# Patient Record
Sex: FEMALE | Race: BLACK OR AFRICAN AMERICAN | URBAN - METROPOLITAN AREA
[De-identification: names, ages, dates, MRNs, and addresses within clinical notes are randomized per-mention and may not be internally consistent; named-entity substitution may affect disease eponyms.]

---

## 2019-04-29 ENCOUNTER — OFFICE VISIT (OUTPATIENT)
Dept: INTERNAL MEDICINE CLINIC | Age: 24
End: 2019-04-29

## 2019-04-29 VITALS
SYSTOLIC BLOOD PRESSURE: 103 MMHG | HEIGHT: 66 IN | HEART RATE: 71 BPM | RESPIRATION RATE: 20 BRPM | OXYGEN SATURATION: 99 % | DIASTOLIC BLOOD PRESSURE: 65 MMHG | BODY MASS INDEX: 26.52 KG/M2 | TEMPERATURE: 98.6 F | WEIGHT: 165 LBS

## 2019-04-29 DIAGNOSIS — Z76.89 ENCOUNTER TO ESTABLISH CARE: ICD-10-CM

## 2019-04-29 DIAGNOSIS — E73.9 LACTOSE INTOLERANCE: Primary | ICD-10-CM

## 2019-04-29 DIAGNOSIS — Z91.018 NUT ALLERGY: ICD-10-CM

## 2019-04-29 NOTE — PROGRESS NOTES
HISTORY OF PRESENT ILLNESS Alem Mike is a 21 y.o. female. Pt here to establish care. She has not PCP She has no ongoing medical issues. Reports does not tolerate red meat--it bothers her stomach Eats mostly chicken or fish Regular milk bothers her stomach. Gas, stomach cramps. Past 2 months has been going to gym regularly at least twice a week. Establish Care The history is provided by the patient. Review of Systems Constitutional:  
     Nut allergies Gastrointestinal:  
     Lactose intolerant All other systems reviewed and are negative. Physical Exam  
Constitutional: She is oriented to person, place, and time. She appears well-developed and well-nourished. No distress. HENT:  
Head: Normocephalic. Eyes: Conjunctivae and EOM are normal.  
Neck: No thyromegaly present. Cardiovascular: Normal rate and regular rhythm. Pulmonary/Chest: Effort normal and breath sounds normal.  
Abdominal: Soft. Musculoskeletal: She exhibits no edema. Lymphadenopathy:  
  She has no cervical adenopathy. Neurological: She is alert and oriented to person, place, and time. Skin: Skin is warm and dry. She is not diaphoretic. Psychiatric: She has a normal mood and affect. Nursing note and vitals reviewed. ASSESSMENT and PLAN 
  ICD-10-CM ICD-9-CM 1. Lactose intolerance E73.9 271.3 2. Nut allergy Z91.018 V15.05   
3. Encounter to establish care Z76.89 V65.8 Past medical history, surgical history, family history, and social history reviewed with patient 
 
disccussed office procedures Discussed BMI/weight, lifestyle, diet and exercise. Discussed effect on blood pressure, blood sugar, and joints especially Focus on limiting white carbs, portion control, and moving more. She is just barely overweight. Tries to eat well and is working out. F/u this summer for [employer].

## 2019-04-29 NOTE — PROGRESS NOTES
ROOM # 5 Carolyn aBshir presents today for Chief Complaint Patient presents with [facility] Care Carolyn Bashir preferred language for health care discussion is english/other. Is someone accompanying this pt? no 
 
Is the patient using any DME equipment during OV? no 
 
Depression Screening: No flowsheet data found. Learning Assessment: 
Learning Assessment 4/29/2019 PRIMARY LEARNER Patient HIGHEST LEVEL OF EDUCATION - PRIMARY LEARNER  4 YEARS OF COLLEGE  
BARRIERS PRIMARY LEARNER NONE PRIMARY LANGUAGE ENGLISH  
LEARNER PREFERENCE PRIMARY DEMONSTRATION  
ANSWERED BY patient RELATIONSHIP SELF Abuse Screening: 
Abuse Screening Questionnaire 4/29/2019 Do you ever feel afraid of your partner? Beauregard Rustam Are you in a relationship with someone who physically or mentally threatens you? Rita Rustam Is it safe for you to go home? Epimenio  Fall Risk No flowsheet data found. Health Maintenance reviewed and discussed per provider. Yes Carolyn Bashir is due for Health Maintenance Due Topic Date Due  
 HPV Age 9Y-34Y (1 - Female 3-dose series) 03/24/2009  DTaP/Tdap/Td series (1 - Tdap) 03/24/2015  PAP AKA CERVICAL CYTOLOGY  03/24/2015 Please order/place referral if appropriate. Advance Directive: 1. Do you have an advance directive in place? Patient Reply: no 
 
2. If not, would you like material regarding how to put one in place? Patient Reply: no 
 
Coordination of Care: 1. Have you been to the ER, urgent care clinic since your last visit? Hospitalized since your last visit? no 
 
2. Have you seen or consulted any other health care providers outside of the [address] [place] [address] since your last visit? Include any pap smears or colon screening.  no

## 2019-07-18 ENCOUNTER — OFFICE VISIT (OUTPATIENT)
Dept: INTERNAL MEDICINE CLINIC | Age: 24
End: 2019-07-18

## 2019-07-18 VITALS
BODY MASS INDEX: 26.68 KG/M2 | TEMPERATURE: 99 F | HEART RATE: 75 BPM | WEIGHT: 166 LBS | OXYGEN SATURATION: 97 % | SYSTOLIC BLOOD PRESSURE: 105 MMHG | HEIGHT: 66 IN | DIASTOLIC BLOOD PRESSURE: 70 MMHG | RESPIRATION RATE: 20 BRPM

## 2019-07-18 NOTE — PROGRESS NOTES
ROOM # 5    Masha Farah presents today for   Chief Complaint   Patient presents with    Well Woman       Masha Farah preferred language for health care discussion is english/other. Is someone accompanying this pt? no    Is the patient using any DME equipment during OV? no    Depression Screening:  No flowsheet data found. Learning Assessment:  Learning Assessment 4/29/2019   PRIMARY LEARNER Patient   HIGHEST LEVEL OF EDUCATION - PRIMARY LEARNER  4 YEARS OF COLLEGE   BARRIERS PRIMARY LEARNER NONE   PRIMARY LANGUAGE ENGLISH   LEARNER PREFERENCE PRIMARY DEMONSTRATION   ANSWERED BY patient   RELATIONSHIP SELF       Abuse Screening:  Abuse Screening Questionnaire 4/29/2019   Do you ever feel afraid of your partner? N   Are you in a relationship with someone who physically or mentally threatens you? N   Is it safe for you to go home? Y       Fall Risk  No flowsheet data found. Health Maintenance reviewed and discussed per provider. Yes    Masha Farah is due for   Health Maintenance Due   Topic Date Due    HPV Age 9Y-34Y (1 - Female 3-dose series) 03/24/2009    DTaP/Tdap/Td series (1 - Tdap) 03/24/2015    PAP AKA CERVICAL CYTOLOGY  03/24/2015     Please order/place referral if appropriate. Advance Directive:  1. Do you have an advance directive in place? Patient Reply: no    2. If not, would you like material regarding how to put one in place? Patient Reply: no    Coordination of Care:  1. Have you been to the ER, urgent care clinic since your last visit? Hospitalized since your last visit? no    2. Have you seen or consulted any other health care providers outside of the [address] [place] [address] since your last visit? Include any pap smears or colon screening.  no

## 2019-08-01 ENCOUNTER — OFFICE VISIT (OUTPATIENT)
Dept: INTERNAL MEDICINE CLINIC | Age: 24
End: 2019-08-01

## 2019-08-01 VITALS
OXYGEN SATURATION: 98 % | SYSTOLIC BLOOD PRESSURE: 103 MMHG | HEIGHT: 66 IN | RESPIRATION RATE: 16 BRPM | BODY MASS INDEX: 26.2 KG/M2 | TEMPERATURE: 97.8 F | DIASTOLIC BLOOD PRESSURE: 68 MMHG | WEIGHT: 163 LBS | HEART RATE: 81 BPM

## 2019-08-01 DIAGNOSIS — Z11.3 SCREEN FOR STD (SEXUALLY TRANSMITTED DISEASE): ICD-10-CM

## 2019-08-01 DIAGNOSIS — Z01.419 WELL WOMAN EXAM WITH ROUTINE GYNECOLOGICAL EXAM: Primary | ICD-10-CM

## 2019-08-01 NOTE — PROGRESS NOTES
HISTORY OF PRESENT ILLNESS  Federico Gr is a 21 y.o. female. Visit Vitals  /68 (BP 1 Location: Right arm, BP Patient Position: Sitting)   Pulse 81   Temp 97.8 °F (36.6 °C) (Oral)   Resp 16   Ht 5' 6\" (1.676 m)   Wt 163 lb (73.9 kg)   SpO2 98%   BMI 26.31 kg/m²       Well Woman   The history is provided by the patient. This is a new problem. Review of Systems   All other systems reviewed and are negative. Physical Exam   Constitutional: She is oriented to person, place, and time. She appears well-developed and well-nourished. No distress. Cardiovascular: Normal rate and regular rhythm. Pulmonary/Chest: Effort normal and breath sounds normal. No breast swelling, tenderness, discharge or bleeding. Genitourinary: No breast swelling, tenderness, discharge or bleeding. There is no rash or tenderness on the right labia. There is no rash or tenderness on the left labia. Musculoskeletal: She exhibits no edema. Neurological: She is alert and oriented to person, place, and time. Skin: Skin is warm and dry. She is not diaphoretic. Psychiatric: She has a normal mood and affect. Nursing note and vitals reviewed. ASSESSMENT and PLAN    ICD-10-CM ICD-9-CM    1. Well woman exam with routine gynecological exam Z01.419 V72.31 PAP, LB, RFX HPV [id number])      CHLAMYDIA/NEISSERIA BY ISABELLE W/REFLEX CONFIRM      PAP, LB, RFX HPV [id number])   2.        Screen for sexually trasmitted diseases Z11.3         Looks great  Doing well    F/u annually or prn

## 2019-08-01 NOTE — PROGRESS NOTES
ROOM # 81 Smooth Maynard presents today for   Chief Complaint   Patient presents with    Well Woman       Polo Level preferred language for health care discussion is english/other. Is someone accompanying this pt? no    Is the patient using any DME equipment during OV? no    Depression Screening:  No flowsheet data found. Learning Assessment:  Learning Assessment 4/29/2019   PRIMARY LEARNER Patient   HIGHEST LEVEL OF EDUCATION - PRIMARY LEARNER  4 YEARS OF COLLEGE   BARRIERS PRIMARY LEARNER NONE   PRIMARY LANGUAGE ENGLISH   LEARNER PREFERENCE PRIMARY DEMONSTRATION   ANSWERED BY patient   RELATIONSHIP SELF       Abuse Screening:  Abuse Screening Questionnaire 4/29/2019   Do you ever feel afraid of your partner? N   Are you in a relationship with someone who physically or mentally threatens you? N   Is it safe for you to go home? Y       Fall Risk  No flowsheet data found. Health Maintenance reviewed and discussed per provider. Yes    Polo Level is due for   Health Maintenance Due   Topic Date Due    HPV Age 9Y-34Y (1 - Female 3-dose series) 03/24/2009    DTaP/Tdap/Td series (1 - Tdap) 03/24/2015    PAP AKA CERVICAL CYTOLOGY  03/24/2015    Influenza Age 9 to Adult  08/01/2019     Please order/place referral if appropriate. Advance Directive:  1. Do you have an advance directive in place? Patient Reply: no    2. If not, would you like material regarding how to put one in place? Patient Reply: no    Coordination of Care:  1. Have you been to the ER, urgent care clinic since your last visit? Hospitalized since your last visit? no    2. Have you seen or consulted any other health care providers outside of the [address] [place] [address] since your last visit? Include any pap smears or colon screening.  no

## 2019-08-02 LAB
CHLAMYDIA TRACHOMATIS, NAA, 180097: NEGATIVE
NEISSERIA GONORRHOEAE, NAA, 180104: NEGATIVE

## 2019-08-05 LAB — GYNECOLOGIC CYTOLOGY REPORT, 18859: NORMAL

## 2022-03-10 ENCOUNTER — OFFICE VISIT (OUTPATIENT)
Dept: INTERNAL MEDICINE CLINIC | Age: 27
End: 2022-03-10
Payer: COMMERCIAL

## 2022-03-10 VITALS
TEMPERATURE: 97.2 F | HEART RATE: 74 BPM | OXYGEN SATURATION: 98 % | HEIGHT: 66 IN | BODY MASS INDEX: 26.84 KG/M2 | SYSTOLIC BLOOD PRESSURE: 107 MMHG | RESPIRATION RATE: 16 BRPM | WEIGHT: 167 LBS | DIASTOLIC BLOOD PRESSURE: 57 MMHG

## 2022-03-10 DIAGNOSIS — Z13.0 SCREENING FOR DEFICIENCY ANEMIA: ICD-10-CM

## 2022-03-10 DIAGNOSIS — Z13.1 SCREENING FOR DIABETES MELLITUS: ICD-10-CM

## 2022-03-10 DIAGNOSIS — Z01.419 WELL WOMAN EXAM WITH ROUTINE GYNECOLOGICAL EXAM: ICD-10-CM

## 2022-03-10 DIAGNOSIS — Z11.59 NEED FOR HEPATITIS C SCREENING TEST: ICD-10-CM

## 2022-03-10 DIAGNOSIS — Z13.89 SCREENING FOR GENITOURINARY CONDITION: ICD-10-CM

## 2022-03-10 DIAGNOSIS — Z00.00 ROUTINE GENERAL MEDICAL EXAMINATION AT A HEALTH CARE FACILITY: Primary | ICD-10-CM

## 2022-03-10 DIAGNOSIS — Z13.220 SCREENING FOR LIPID DISORDERS: ICD-10-CM

## 2022-03-10 DIAGNOSIS — Z11.3 SCREEN FOR STD (SEXUALLY TRANSMITTED DISEASE): ICD-10-CM

## 2022-03-10 PROCEDURE — 99395 PREV VISIT EST AGE 18-39: CPT | Performed by: INTERNAL MEDICINE

## 2022-03-10 NOTE — PROGRESS NOTES
1. \"Have you been to the ER, urgent care clinic since your last visit? Hospitalized since your last visit? \" No    2. \"Have you seen or consulted any other health care providers outside of the [address] [place] [address] since your last visit? \" No     3. For patients aged 39-70: Has the patient had a colonoscopy / FIT/ Cologuard? NA - based on age      If the patient is female:    4. For patients aged 41-77: Has the patient had a mammogram within the past 2 years? NA - based on age or sex      11. For patients aged 21-65: Has the patient had a pap smear?  No

## 2022-03-10 NOTE — PROGRESS NOTES
HISTORY OF PRESENT ILLNESS  Gaudencio Gorman is a 31 y.o. female. BP (!) 107/57 (BP 1 Location: Left upper arm, BP Patient Position: Sitting, BP Cuff Size: Adult)   Pulse 74   Temp 97.2 °F (36.2 °C) (Temporal)   Resp 16   Ht 5' 6\" (1.676 m)   Wt 167 lb (75.8 kg)   LMP 02/22/2022 (Exact Date)   SpO2 98%   BMI 26.95 kg/m²     Getting  later this year. Physical  The history is provided by the patient. Review of Systems   Constitutional: Negative. Negative for chills and fever. Respiratory: Negative. Cardiovascular: Negative. Gastrointestinal: Negative. Genitourinary:        Had one irregular period in Jan/Feb.       Physical Exam  Vitals and nursing note reviewed. Exam conducted with a chaperone present. Constitutional:       General: She is not in acute distress. Appearance: Normal appearance. She is well-developed. She is not diaphoretic. HENT:      Head: Normocephalic and atraumatic. Right Ear: Ear canal and external ear normal.      Left Ear: Ear canal and external ear normal.      Nose: Nose normal.      Mouth/Throat:      Mouth: Mucous membranes are moist.      Pharynx: No oropharyngeal exudate. Eyes:      General: No scleral icterus. Right eye: No discharge. Left eye: No discharge. Conjunctiva/sclera: Conjunctivae normal.      Pupils: Pupils are equal, round, and reactive to light. Neck:      Thyroid: No thyromegaly. Vascular: No JVD. Trachea: No tracheal deviation. Cardiovascular:      Rate and Rhythm: Normal rate and regular rhythm. Heart sounds: Normal heart sounds. No murmur heard. No gallop. Pulmonary:      Effort: Pulmonary effort is normal. No respiratory distress. Breath sounds: Normal breath sounds. No wheezing or rales. Abdominal:      General: Bowel sounds are normal. There is no distension. Palpations: Abdomen is soft. There is no mass. Tenderness: There is no abdominal tenderness.  There is no guarding or rebound. Genitourinary:     Exam position: Supine. Labia:         Right: No rash. Left: No rash. Vagina: Normal.      Cervix: Normal.      Uterus: Normal.       Adnexa: Right adnexa normal and left adnexa normal.   Musculoskeletal:         General: No tenderness. Cervical back: Normal range of motion and neck supple. Lymphadenopathy:      Cervical: No cervical adenopathy. Skin:     General: Skin is warm and dry. Findings: No erythema or rash. Neurological:      General: No focal deficit present. Mental Status: She is alert and oriented to person, place, and time. She is not disoriented. Cranial Nerves: No cranial nerve deficit. Sensory: No sensory deficit. Motor: No abnormal muscle tone. Coordination: Coordination normal.      Gait: Gait normal.      Deep Tendon Reflexes: Reflexes are normal and symmetric. Reflexes normal.      Comments:          Psychiatric:         Mood and Affect: Mood normal.         Speech: Speech normal.         Behavior: Behavior normal.         Thought Content: Thought content normal.         Judgment: Judgment normal.         ASSESSMENT and PLAN    ICD-10-CM ICD-9-CM    1. Routine general medical examination at a health care facility  Z00.00 V70.0 URINALYSIS W/ RFLX MICROSCOPIC   2. Well woman exam with routine gynecological exam  Z01.419 V72.31 PAP IG, APTIMA HPV AND RFX 16/18,45 ([id number])      CHLAMYDIA/NEISSERIA AMPLIFICATION   3. Screening for diabetes mellitus  B47.2 H58.4 METABOLIC PANEL, BASIC   4. Screening for deficiency anemia  Z13.0 V78.1 CBC WITH AUTOMATED DIFF   5. Screening for lipid disorders  Z13.220 V77.91 LIPID PANEL   6. Need for hepatitis C screening test  Z11.59 V73.89 HCV AB W/RFLX TO ISABELLE   7. Screening for genitourinary condition  Z13.89 V81.6 URINALYSIS W/ RFLX MICROSCOPIC   8.  Screen for STD (sexually transmitted disease)  Z11.3 V74.5 PAP IG, APTIMA HPV AND RFX 16/18,45 ([id number]) CHLAMYDIA/NEISSERIA AMPLIFICATION      T VAGINALIS AMPLIFICATION         Doing well    Update lab    F/u annually or prn

## 2022-03-11 LAB
ABSOLUTE LYMPHOCYTE COUNT, 10803: 2.3 K/UL (ref 1–4.8)
ANION GAP SERPL CALC-SCNC: 12 MMOL/L (ref 3–15)
BASOPHILS # BLD: 0 K/UL (ref 0–0.2)
BASOPHILS NFR BLD: 0 % (ref 0–2)
BILIRUB UR QL: NEGATIVE
BUN SERPL-MCNC: 9 MG/DL (ref 6–22)
CALCIUM SERPL-MCNC: 10 MG/DL (ref 8.4–10.5)
CHLAMYDIA TRACHOMATIS, NAA, 180097: NEGATIVE
CHLORIDE SERPL-SCNC: 104 MMOL/L (ref 98–110)
CHOLEST SERPL-MCNC: 207 MG/DL (ref 110–200)
CLARITY: CLEAR
CO2 SERPL-SCNC: 22 MMOL/L (ref 20–32)
COLOR UR: YELLOW
CREAT SERPL-MCNC: 0.8 MG/DL (ref 0.5–1.2)
EOSINOPHIL # BLD: 0.1 K/UL (ref 0–0.5)
EOSINOPHIL NFR BLD: 1 % (ref 0–6)
ERYTHROCYTE [DISTWIDTH] IN BLOOD BY AUTOMATED COUNT: 14.1 % (ref 10–15.5)
GFRAA, 66117: >60
GFRNA, 66118: >60
GLUCOSE SERPL-MCNC: 79 MG/DL (ref 70–99)
GLUCOSE UR QL: NEGATIVE MG/DL
GRANULOCYTES,GRANS: 67 % (ref 40–75)
HCT VFR BLD AUTO: 39.8 % (ref 35.1–46.5)
HCV AB SER IA-ACNC: NORMAL
HDLC SERPL-MCNC: 2.9 MG/DL (ref 0–5)
HDLC SERPL-MCNC: 71 MG/DL
HGB BLD-MCNC: 12.4 G/DL (ref 11.7–15.5)
HGB UR QL STRIP: NEGATIVE
KETONES UR QL STRIP.AUTO: NEGATIVE MG/DL
LDL/HDL RATIO,LDHD: 1.8
LDLC SERPL CALC-MCNC: 124 MG/DL (ref 50–99)
LEUKOCYTE ESTERASE: NEGATIVE
LYMPHOCYTES, LYMLT: 25 % (ref 20–45)
MCH RBC QN AUTO: 26 PG (ref 26–34)
MCHC RBC AUTO-ENTMCNC: 31 G/DL (ref 31–36)
MCV RBC AUTO: 83 FL (ref 80–99)
MONOCYTES # BLD: 0.6 K/UL (ref 0.1–1)
MONOCYTES NFR BLD: 7 % (ref 3–12)
NEUTROPHILS # BLD AUTO: 6.1 K/UL (ref 1.8–7.7)
NITRITE UR QL STRIP.AUTO: NEGATIVE
NON-HDL CHOLESTEROL, 011976: 136 MG/DL
PH UR STRIP: 6 PH (ref 5–8)
PLATELET # BLD AUTO: 287 K/UL (ref 140–440)
PMV BLD AUTO: 12.8 FL (ref 9–13)
POTASSIUM SERPL-SCNC: 4.1 MMOL/L (ref 3.5–5.5)
PROT UR QL STRIP: NEGATIVE MG/DL
RBC # BLD AUTO: 4.78 M/UL (ref 3.8–5.2)
SODIUM SERPL-SCNC: 138 MMOL/L (ref 133–145)
SP GR UR: 1.01 (ref 1–1.03)
TRICH VAG BY NAA, 180087: NEGATIVE
TRIGL SERPL-MCNC: 61 MG/DL (ref 40–149)
URINE ASCORBIC ACID: NEGATIVE MG/DL
UROBILINOGEN UR STRIP-MCNC: <2 MG/DL
VLDLC SERPL CALC-MCNC: 12 MG/DL (ref 8–30)
WBC # BLD AUTO: 9.1 K/UL (ref 4–11)

## 2022-03-14 LAB
HPV HIGH RISK, 507303: NEGATIVE
PAP IMAGE GUIDED, 8900296: NORMAL

## 2022-09-06 ENCOUNTER — OFFICE VISIT (OUTPATIENT)
Dept: INTERNAL MEDICINE CLINIC | Age: 27
End: 2022-09-06
Payer: COMMERCIAL

## 2022-09-06 VITALS
WEIGHT: 166 LBS | HEIGHT: 66 IN | HEART RATE: 66 BPM | SYSTOLIC BLOOD PRESSURE: 94 MMHG | OXYGEN SATURATION: 100 % | DIASTOLIC BLOOD PRESSURE: 73 MMHG | BODY MASS INDEX: 26.68 KG/M2 | RESPIRATION RATE: 14 BRPM | TEMPERATURE: 97.2 F
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
## 2022-09-06 DIAGNOSIS — M62.838 MUSCLE SPASM: Primary | ICD-10-CM

## 2022-09-06 PROCEDURE — 99213 OFFICE O/P EST LOW 20 MIN: CPT | Performed by: INTERNAL MEDICINE
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
